# Patient Record
Sex: FEMALE
[De-identification: names, ages, dates, MRNs, and addresses within clinical notes are randomized per-mention and may not be internally consistent; named-entity substitution may affect disease eponyms.]

---

## 2021-06-25 ENCOUNTER — HOSPITAL ENCOUNTER (OUTPATIENT)
Dept: HOSPITAL 5 - TRG | Age: 31
Discharge: HOME | End: 2021-06-25
Attending: OBSTETRICS & GYNECOLOGY
Payer: MEDICAID

## 2021-06-25 VITALS — DIASTOLIC BLOOD PRESSURE: 66 MMHG | SYSTOLIC BLOOD PRESSURE: 110 MMHG

## 2021-06-25 DIAGNOSIS — Z3A.28: ICD-10-CM

## 2021-06-25 LAB
BILIRUB UR QL STRIP: (no result)
BLOOD UR QL VISUAL: (no result)
CAOX CRY #/AREA URNS HPF: (no result) /[HPF]
MUCOUS THREADS #/AREA URNS HPF: (no result) /HPF
PH UR STRIP: 5 [PH] (ref 5–7)
RBC #/AREA URNS HPF: 3 /HPF (ref 0–6)
UROBILINOGEN UR-MCNC: < 2 MG/DL (ref ?–2)
WBC #/AREA URNS HPF: 2 /HPF (ref 0–6)

## 2021-06-25 PROCEDURE — 81001 URINALYSIS AUTO W/SCOPE: CPT

## 2021-06-25 PROCEDURE — 96360 HYDRATION IV INFUSION INIT: CPT

## 2021-06-25 PROCEDURE — 59025 FETAL NON-STRESS TEST: CPT

## 2021-08-20 ENCOUNTER — HOSPITAL ENCOUNTER (OUTPATIENT)
Dept: HOSPITAL 5 - TRG | Age: 31
Discharge: HOME | End: 2021-08-20
Attending: OBSTETRICS & GYNECOLOGY
Payer: MEDICAID

## 2021-08-20 VITALS — SYSTOLIC BLOOD PRESSURE: 97 MMHG | DIASTOLIC BLOOD PRESSURE: 52 MMHG

## 2021-08-20 DIAGNOSIS — Z3A.36: ICD-10-CM

## 2021-08-20 DIAGNOSIS — O26.893: Primary | ICD-10-CM

## 2021-08-20 DIAGNOSIS — R11.0: ICD-10-CM

## 2021-08-20 DIAGNOSIS — Z79.899: ICD-10-CM

## 2021-08-20 DIAGNOSIS — R51.9: ICD-10-CM

## 2021-08-20 LAB
ALT SERPL-CCNC: 10 UNITS/L (ref 7–56)
BACTERIA #/AREA URNS HPF: (no result) /HPF
BILIRUB UR QL STRIP: (no result)
BLOOD UR QL VISUAL: (no result)
HCT VFR BLD CALC: 31.6 % (ref 30.3–42.9)
HGB BLD-MCNC: 11 GM/DL (ref 10.1–14.3)
MCHC RBC AUTO-ENTMCNC: 35 % (ref 30–34)
MCV RBC AUTO: 81 FL (ref 79–97)
MUCOUS THREADS #/AREA URNS HPF: (no result) /HPF
PH UR STRIP: 6 [PH] (ref 5–7)
PLATELET # BLD: 151 K/MM3 (ref 140–440)
PROT UR STRIP-MCNC: (no result) MG/DL
RBC # BLD AUTO: 3.92 M/MM3 (ref 3.65–5.03)
RBC #/AREA URNS HPF: 13 /HPF (ref 0–6)
URATE SERPL-MCNC: 3.7 MG/DL (ref 3.5–7.6)
UROBILINOGEN UR-MCNC: < 2 MG/DL (ref ?–2)
WBC #/AREA URNS HPF: 11 /HPF (ref 0–6)

## 2021-08-20 PROCEDURE — 85027 COMPLETE CBC AUTOMATED: CPT

## 2021-08-20 PROCEDURE — 36415 COLL VENOUS BLD VENIPUNCTURE: CPT

## 2021-08-20 PROCEDURE — 82565 ASSAY OF CREATININE: CPT

## 2021-08-20 PROCEDURE — 87086 URINE CULTURE/COLONY COUNT: CPT

## 2021-08-20 PROCEDURE — 84450 TRANSFERASE (AST) (SGOT): CPT

## 2021-08-20 PROCEDURE — 96365 THER/PROPH/DIAG IV INF INIT: CPT

## 2021-08-20 PROCEDURE — 96366 THER/PROPH/DIAG IV INF ADDON: CPT

## 2021-08-20 PROCEDURE — 84550 ASSAY OF BLOOD/URIC ACID: CPT

## 2021-08-20 PROCEDURE — 84460 ALANINE AMINO (ALT) (SGPT): CPT

## 2021-08-20 PROCEDURE — 81001 URINALYSIS AUTO W/SCOPE: CPT

## 2021-08-20 PROCEDURE — 96361 HYDRATE IV INFUSION ADD-ON: CPT

## 2021-08-20 PROCEDURE — 96367 TX/PROPH/DG ADDL SEQ IV INF: CPT

## 2021-08-20 PROCEDURE — 83615 LACTATE (LD) (LDH) ENZYME: CPT

## 2021-08-20 NOTE — EVENT NOTE
Date: 08/20/21 (Pt is feeling much better)


Pt is 36+ wks, who presented to triage with c/o a HA. She denies vaginal 

bleeding, LOF, ctxs, spots before her eyes, blurred vision, shortness of breath,

chest pain, and upper abdominal pain. There is positive fetal movement. Her 

blood pressures and pre e labs are WNL. She had one blood pressure that was 

143/70. Per RN and pt the cuff was on her elbow. Category 1 fetal monitor strip 

throughout triage stay. Pt desires to go home and states that her HA feels 

better. She was given IV and PO medication for the HA. Pt will have follow up 

visit in the office on Monday @ 10am. Pt aware of appointment date and time.

## 2021-10-24 ENCOUNTER — HOSPITAL ENCOUNTER (EMERGENCY)
Dept: HOSPITAL 5 - ED | Age: 31
Discharge: HOME | End: 2021-10-24
Payer: MEDICAID

## 2021-10-24 VITALS — DIASTOLIC BLOOD PRESSURE: 73 MMHG | SYSTOLIC BLOOD PRESSURE: 117 MMHG

## 2021-10-24 DIAGNOSIS — N39.0: Primary | ICD-10-CM

## 2021-10-24 DIAGNOSIS — N93.9: ICD-10-CM

## 2021-10-24 DIAGNOSIS — N94.6: ICD-10-CM

## 2021-10-24 DIAGNOSIS — N92.1: ICD-10-CM

## 2021-10-24 DIAGNOSIS — Z79.899: ICD-10-CM

## 2021-10-24 DIAGNOSIS — Z98.890: ICD-10-CM

## 2021-10-24 LAB
ALBUMIN SERPL-MCNC: 4.3 G/DL (ref 3.9–5)
ALT SERPL-CCNC: 40 UNITS/L (ref 7–56)
BASOPHILS # (AUTO): 0.1 K/MM3 (ref 0–0.1)
BASOPHILS NFR BLD AUTO: 1 % (ref 0–1.8)
BILIRUB DIRECT SERPL-MCNC: < 0.2 MG/DL (ref 0–0.2)
BLOOD UR QL VISUAL: (no result)
BUN SERPL-MCNC: 15 MG/DL (ref 7–17)
BUN/CREAT SERPL: 21 %
CALCIUM SERPL-MCNC: 8.8 MG/DL (ref 8.4–10.2)
EOSINOPHIL # BLD AUTO: 0.3 K/MM3 (ref 0–0.4)
EOSINOPHIL NFR BLD AUTO: 3.8 % (ref 0–4.3)
HCT VFR BLD CALC: 35.8 % (ref 30.3–42.9)
HEMOLYSIS INDEX: 12
HGB BLD-MCNC: 11.4 GM/DL (ref 10.1–14.3)
LYMPHOCYTES # BLD AUTO: 3.4 K/MM3 (ref 1.2–5.4)
LYMPHOCYTES NFR BLD AUTO: 38.3 % (ref 13.4–35)
MCHC RBC AUTO-ENTMCNC: 32 % (ref 30–34)
MCV RBC AUTO: 81 FL (ref 79–97)
MONOCYTES # (AUTO): 0.5 K/MM3 (ref 0–0.8)
MONOCYTES % (AUTO): 5.8 % (ref 0–7.3)
MUCOUS THREADS #/AREA URNS HPF: (no result) /HPF
PH UR STRIP: (no result) [PH] (ref 5–7)
PLATELET # BLD: 241 K/MM3 (ref 140–440)
PROT UR STRIP-MCNC: (no result) MG/DL
RBC # BLD AUTO: 4.44 M/MM3 (ref 3.65–5.03)
RBC #/AREA URNS HPF: > 182 /HPF (ref 0–6)
UROBILINOGEN UR-MCNC: (no result) MG/DL (ref ?–2)
WBC #/AREA URNS HPF: 145 /HPF (ref 0–6)

## 2021-10-24 PROCEDURE — 85025 COMPLETE CBC W/AUTO DIFF WBC: CPT

## 2021-10-24 PROCEDURE — 81001 URINALYSIS AUTO W/SCOPE: CPT

## 2021-10-24 PROCEDURE — 36415 COLL VENOUS BLD VENIPUNCTURE: CPT

## 2021-10-24 PROCEDURE — 84702 CHORIONIC GONADOTROPIN TEST: CPT

## 2021-10-24 PROCEDURE — 80048 BASIC METABOLIC PNL TOTAL CA: CPT

## 2021-10-24 PROCEDURE — 76856 US EXAM PELVIC COMPLETE: CPT

## 2021-10-24 PROCEDURE — 99284 EMERGENCY DEPT VISIT MOD MDM: CPT

## 2021-10-24 PROCEDURE — 80076 HEPATIC FUNCTION PANEL: CPT

## 2021-10-24 PROCEDURE — 96372 THER/PROPH/DIAG INJ SC/IM: CPT

## 2021-10-24 NOTE — EMERGENCY DEPARTMENT REPORT
ED Female  HPI





- General


Chief complaint: Vaginal Bleeding


Stated complaint: VAGINAL BLEEDING X3 DAYS


Source: patient


Mode of arrival: Ambulatory


Limitations: No Limitations





- History of Present Illness


Initial comments: 





Patient is a A1 30-year-old  female who is approximately 5 weeks 

postpartum through  presents to the ED with acute onset persistent hea

vy vaginal bleeding with large blood clots for the last 12 hours.  Patient 

states that she feels to pads per hour and that she initially was evaluated at 

an urgent care clinic who discharged her home but when she could contacted her 

OB/GYN physician she was advised to come to the ED immediately for further 

evaluation.  Patient denies dysuria, urinary frequency and urgency, nausea, 

vomiting, fever, chills, diarrhea, dizziness, syncope, chest pain or shortness 

of breath, headache, back pain or vaginal discharge.


MD Complaint: vaginal bleeding, pelvic pain (Suprapubic pain)


-: Sudden, hour(s) (12), This morning


Location: suprapubic, other (Vaginal)


Radiation: non-radiating


Severity: severe


Severity scale (0 -10): 8


Quality: cramping, aching


Consistency: constant


Improves with: none


Worsens with: none


Are you Pregnant Now?: No (5 weeks postpartum through )


Associated Symptoms: vaginal bleeding, abdominal pain (Suprapubic pressure and 

pain).  denies: nausea/vomiting, fever/chills, headaches, loss of appetite, 

dysuria, hematuria, rash, shortness of breath, syncope, other





- Related Data


Sexually active: Yes


: 5


Para: 4


A: 1


                                Home Medications











 Medication  Instructions  Recorded  Confirmed  Last Taken


 


Tylenol 1,000 mg PO PRN PRN 21








                                  Previous Rx's











 Medication  Instructions  Recorded  Last Taken  Type


 


Docusate Sodium [Colace] 100 mg PO BID PRN #30 capsule 21 Unknown Rx


 


Ferrous Sulfate [Feosol 325 MG tab] 325 mg PO BID #90 tablet 21 Unknown Rx


 


Ibuprofen [Motrin 800 MG tab] 800 mg PO TID PRN #30 tablet 21 Unknown Rx


 


Lidocain2.5%/Prilocai2.5% [Emla] 5 gm TP ONCE #1 tube 21 Unknown Rx


 


oxyCODONE /ACETAMINOPHEN [Percocet 1 - 2 tab PO Q6HR PRN #14 tablet 21 Unk

nown Rx





5/325 mg]    


 


cephALEXin [Keflex] 500 mg PO Q8HR #30 cap 10/24/21 Unknown Rx


 


medroxyPROGESTERone ACETATE 10 mg PO DAILY #14 tablet 10/24/21 Unknown Rx





[Provera]    











                                    Allergies











Allergy/AdvReac Type Severity Reaction Status Date / Time


 


No Known Allergies Allergy   Verified 21 10:03














ED Review of Systems


ROS: 


Stated complaint: VAGINAL BLEEDING X3 DAYS


Other details as noted in HPI





Constitutional: denies: chills, fever


Eyes: denies: eye pain, eye discharge, vision change


ENT: denies: ear pain, throat pain


Respiratory: denies: cough, shortness of breath, wheezing


Cardiovascular: denies: chest pain, palpitations


Endocrine: no symptoms reported


Gastrointestinal: abdominal pain (Suprapubic pressure and cramps).  denies: 

nausea, vomiting, diarrhea


Genitourinary: abnormal menses (Heavy vaginal bleeding).  denies: urgency, 

dysuria, discharge


Musculoskeletal: denies: back pain, joint swelling, arthralgia


Skin: denies: rash, lesions


Neurological: denies: headache, weakness, paresthesias


Psychiatric: denies: anxiety, depression


Hematological/Lymphatic: denies: easy bleeding, easy bruising





ED Past Medical Hx





- Past Medical History


Previous Medical History?: Yes


Hx Hypertension: No


Hx Heart Attack/AMI: No


Hx Congestive Heart Failure: No


Hx Diabetes: No


Hx Deep Vein Thrombosis: No


Hx Liver Disease: No


Hx Renal Disease: No


Hx Sickle Cell Disease: No


Hx Seizures: No


Hx Asthma: No


Hx COPD: No


Hx HIV: No





- Surgical History


Past Surgical History?: Yes


Additional Surgical History: 





- Social History


Smoking Status: Never Smoker





- Medications


Home Medications: 


                                Home Medications











 Medication  Instructions  Recorded  Confirmed  Last Taken  Type


 


Docusate Sodium [Colace] 100 mg PO BID PRN #30 capsule 21  Unknown Rx


 


Ferrous Sulfate [Feosol 325 MG tab] 325 mg PO BID #90 tablet 21  Unknown 

Rx


 


Ibuprofen [Motrin 800 MG tab] 800 mg PO TID PRN #30 tablet 21  Unknown Rx


 


Lidocain2.5%/Prilocai2.5% [Emla] 5 gm TP ONCE #1 tube 21  Unknown Rx


 


Tylenol 1,000 mg PO PRN PRN 21 History


 


oxyCODONE /ACETAMINOPHEN [Percocet 1 - 2 tab PO Q6HR PRN #14 tablet 21  

Unknown Rx





5/325 mg]     


 


cephALEXin [Keflex] 500 mg PO Q8HR #30 cap 10/24/21  Unknown Rx


 


medroxyPROGESTERone ACETATE 10 mg PO DAILY #14 tablet 10/24/21  Unknown Rx





[Provera]     














ED Physical Exam





- General


Limitations: No Limitations


General appearance: alert, in no apparent distress





- Head


Head exam: Present: atraumatic, normocephalic, normal inspection





- Eye


Eye exam: Present: normal appearance, PERRL, EOMI


Pupils: Present: normal accommodation





- ENT


ENT exam: Present: normal exam, normal orophraynx, mucous membranes moist, TM's 

normal bilaterally, normal external ear exam





- Neck


Neck exam: Present: normal inspection, full ROM





- Respiratory


Respiratory exam: Present: normal lung sounds bilaterally.  Absent: respiratory 

distress, wheezes, rales, rhonchi, chest wall tenderness, accessory muscle use, 

prolonged expiratory





- Cardiovascular


Cardiovascular Exam: Present: regular rate, normal rhythm, normal heart sounds. 

 Absent: systolic murmur, diastolic murmur, rubs, gallop





- GI/Abdominal


GI/Abdominal exam: Present: soft, normal bowel sounds.  Absent: tenderness, 

guarding, rebound, hyperactive bowel sounds, hypoactive bowel sounds, or

ganomegaly





- 


Bi-manual exam: Present: other (Pelvic exam deferred at this time)





- Extremities Exam


Extremities exam: Present: normal inspection, full ROM, normal capillary refill





- Back Exam


Back exam: Present: normal inspection, full ROM.  Absent: tenderness, CVA 

tenderness (R), CVA tenderness (L), muscle spasm, paraspinal tenderness, 

vertebral tenderness





- Neurological Exam


Neurological exam: Present: alert, oriented X3, CN II-XII intact, normal gait, 

reflexes normal





- Psychiatric


Psychiatric exam: Present: normal affect, normal mood





- Skin


Skin exam: Present: warm, dry, intact, normal color.  Absent: rash





ED Course


                                   Vital Signs











  10/24/21





  19:52


 


Temperature 98.1 F


 


Pulse Rate 55 L


 


Respiratory 20





Rate 


 


Blood Pressure 117/73





[Left] 


 


O2 Sat by Pulse 98





Oximetry 














- Reevaluation(s)


Reevaluation #1: 





10/24/21 22:46


I discussed the patient's case with the OB/GYN physician Dr. Suri Alberto, the

 patient's OB/GYN physician who also reviewed all the lab test results, came to 

the ED and evaluated the patient and agreed with plan of care.





ED Medical Decision Making





- Lab Data


Result diagrams: 


                                 10/24/21 19:30





                                 10/24/21 19:30





- Radiology Data


Radiology results: report reviewed, image reviewed





[facility]  


                                     [address] [place] [address]  


 


                                         Ultrasound Report   


                                               Signed  


 


Patient: BISHOP CLARK                                                     

           MR#: [id number]  


[id number]          


: 1990                                                                

Acct:[id number]      


 


Age/Sex: 30 / F                                                                

ADM Date: 10/24/21     


 


Loc: ED       


Attending Dr:   


 


 


Ordering Physician: MATTY BARONE  


Date of Service: 10/24/21  


Procedure(s): US pelvic complete  


Accession Number(s): [id number]  


 


cc: MATTY BARONE   


 


 


PELVIC ULTRASOUND  


 


 INDICATION: Heavy vaginal bleeding, 5 weeks post-partum  


 


 COMPARISON: None pertinent available  


 


 TECHNIQUE: Transabdominal  


 


 FINDINGS:  


 


 Uterus: Measures 10.2 x 5.0 x 6.8 cm. Endometrial stripe measures 4 mm. No 

fluid is seen within the


endometrial canal. No evidence of retained products of conception is seen. No 

focal uterine lesions 


are noted.  


 


 Right ovary: Measures 3 cm in length and shows no abnormalities. Blood flow is 

noted.  


 


 Left ovary: Measures 5 cm in length and shows a 3.6 cm slightly complex cyst 

with a thin septation 


and minimal internal echoes. Ovarian blood flow is noted.  


 


 No free fluid is seen.  


 


 IMPRESSION:  


 1. No uterine abnormalities are seen. No evidence of retained products of 

conception.  


 2. Moderate size probably benign left ovarian cyst. Recommend ultrasound 

follow-up.  


 


 Signer Name: Mehul Fuentes MD   


 Signed: 10/24/2021 9:53 PM  


 Workstation Name: [id number]   


 


 


Transcribed By: CYNDY  


Dictated By: Mehul Fuentes MD  


Electronically Authenticated By: Mehul Fuentes MD    


Signed Date/Time: 10/24/21 2153                                


 


 


 


DD/DT: 10/24/21 2151                                                            

  


TD/TT:








- Medical Decision Making





This is a A1 30-year-old  female who is approximately 5 weeks 

postpartum through  presents to the ED with acute onset persistent 

heavy vaginal bleeding with large blood clots for the last 12 hours.  Patient 

states that she feels to pads per hour and that she initially was evaluated at 

an urgent care clinic who discharged her home but when she could contacted her 

OB/GYN physician she was advised to come to the ED immediately for further 

evaluation.  In the ED, patient is alert and oriented x3 and is not in any distr

ess.  Lab test results were reviewed and are all nonactionable except for 

urinalysis that showed significant UTI and bleeding.  Pelvic ultrasound showed  

no uterine abnormalities are seen. No evidence of retained products of 

conception.  It also showed moderate size probably benign left ovarian cyst. 

Recommend ultrasound follow-up.  Patient was treated in the ED with Rocephin 1 g

 intramuscular injection for acute UTI.  Patient also received 

medroxyprogesterone p.o. x1 and Tylenol 1 g p.o. x1.  I discussed the patient's 

case with the patient's OB/GYN physician Dr. Suri Alberto who came to the ED 

and evaluated the patient and agreed the plan of care.  Otherwise from the 

standpoint of the ED, patient was discharged home on oral antibiotics for acute 

urinary tract infection and Provera, and advised to follow-up with OB/GYN 

physician Dr. Alberto in 3 to 5 days for reevaluation.  Patient was advised 

return to the ED immediately if symptoms get worse.





- Differential Diagnosis


UTI; dysmenorrhea; ovarian cyst; pregnancy; retained products of conception


Critical care attestation.: 


If time is entered above; I have spent that time in minutes in the direct care 

of this critically ill patient, excluding procedure time.








ED Disposition


Clinical Impression: 


 Acute urinary tract infection, Dysfunctional uterine bleeding, Dysmenorrhea, 

Metrorrhagia





Disposition: 01 HOME / SELF CARE / HOMELESS


Is pt being admited?: No


Does the pt Need Aspirin: No


Condition: Stable


Instructions:  Urinary Tract Infection, Adult, Easy-to-Read, Abnormal Uterine 

Bleeding, Easy-to-Read, Dysmenorrhea, Easy-to-Read, Metrorrhagia, Easy-to-Read


Additional Instructions: 


All lab test results were reviewed and are all nonactionable except for urinary 

tract infection in urinalysis.  Pelvic ultrasound showed no uterine 

abnormalities are seen. No evidence of retained products of conception.  It also

 showed moderate size probably benign left ovarian cyst.  Therefore take me

dications with food, drink plenty of fluids and follow-up with your OB/GYN 

physician in 5 to 7 days for reevaluation.  Return to the ED immediately if 

symptoms get worse.


Prescriptions: 


cephALEXin [Keflex] 500 mg PO Q8HR #30 cap


medroxyPROGESTERone ACETATE [Provera] 10 mg PO DAILY #14 tablet


Referrals: 


SURI ALBERTO MD [Staff Physician] - 3-5 Days


Time of Disposition: 22:43


Print Language: ENGLISH

## 2021-10-24 NOTE — CONSULTATION
History of Present Illness


Consult date: 10/24/21


Reason for consult: menorrhagia


History of present illness: 





Patient is a  who is 5 weeks s/p PLTCS and BTL presenting with vaginal 

bleeding. Notes it began on yesterday and is heavy. Notes using at least 1 pad 

per hour. This morning states she soaked through a pad and had bleeding that 

dripped down her leg. During that time she began to have chills. Concerned she 

may be hemorrhaging. Called the answering service and was instructed to present 

to the ED> Went to an urgent care near her home where labs were drawn and she 

was told they were fine and to follow up with OB. Decided to present to [facility] 

after being instructed to present to the ED for evaluation. Denies abdominal 

pain, n/v, lightheadedness and dizziness. 





Past History


Past Medical History: no pertinent history


Past Surgical History: GYN/uterine surgery (PLTCS)


Family/Genetic History: none


Social history: no significant social history





- Obstetrical History


: 5


Para: 5


Hx # Term Pregnancies: 4


Number of  Pregnancies: 0


Spontaneous Abortions: 1


Induced : 0


Number of Living Children: 4





Medications and Allergies


                                    Allergies











Allergy/AdvReac Type Severity Reaction Status Date / Time


 


No Known Allergies Allergy   Verified 21 10:03











                                Home Medications











 Medication  Instructions  Recorded  Confirmed  Last Taken  Type


 


Docusate Sodium [Colace] 100 mg PO BID PRN #30 capsule 21  Unknown Rx


 


Ferrous Sulfate [Feosol 325 MG tab] 325 mg PO BID #90 tablet 21  Unknown 

Rx


 


Ibuprofen [Motrin 800 MG tab] 800 mg PO TID PRN #30 tablet 21  Unknown Rx


 


Lidocain2.5%/Prilocai2.5% [Emla] 5 gm TP ONCE #1 tube 21  Unknown Rx


 


Tylenol 1,000 mg PO PRN PRN 21 History


 


oxyCODONE /ACETAMINOPHEN [Percocet 1 - 2 tab PO Q6HR PRN #14 tablet 21  

Unknown Rx





5/325 mg]     


 


cephALEXin [Keflex] 500 mg PO Q8HR #30 cap 10/24/21  Unknown Rx


 


medroxyPROGESTERone ACETATE 10 mg PO DAILY #14 tablet 10/24/21  Unknown Rx





[Provera]     














Review of Systems


All systems: negative


Genitourinary: vaginal bleeding





- Vital Signs


Vital signs: 


                                   Vital Signs











Temp Pulse Resp BP Pulse Ox


 


 98.1 F   55 L  20   117/73   98 


 


 10/24/21 19:52  10/24/21 19:52  10/24/21 19:52  10/24/21 19:52  10/24/21 19:52








                                        











Temp Pulse Resp BP Pulse Ox


 


 98.1 F   55 L  20   117/73   98 


 


 10/24/21 19:52  10/24/21 19:52  10/24/21 19:52  10/24/21 19:52  10/24/21 19:52














- Physical Exam


Abdomen: Positive: normal appearance, soft, other (incision well-healed)


Genitourinary (Female): Positive: normal external genitalia


Vagina: Positive: other (dark blood in the vagina, cleaned and no lesions noted)


Cervix: Positive: other (No lesions or active bleeding noted )


Uterus: Positive: other (difficult to palpate secondary to hsbitus)


Extremities: Positive: normal





Results


Result Diagrams: 


                                 10/24/21 19:30





                                 10/24/21 19:30


                              Abnormal lab results











  10/24/21 10/24/21 10/24/21 Range/Units





  19:30 19:30 19:54 


 


MCH  26 L    (28-32)  pg


 


RDW  16.4 H    (13.2-15.2)  %


 


Lymph % (Auto)  38.3 H    (13.4-35.0)  %


 


Carbon Dioxide   21 L   (22-30)  mmol/L


 


Urine WBC (Auto)    145.0 H  (0.0-6.0)  /HPF


 


U Epithel Cells (Auto)    29.0 H  (0-13.0)  /HPF








All other labs normal.





Ultrasound: report reviewed





Assessment and Plan





- Patient Problems


(1) Dysfunctional uterine bleeding


Current Visit: Yes   Status: Acute   


Plan to address problem: 


U/S reviewed and no abnormalities of the uterus noted


H/H appropriate at 11.4


Discussed with patient likely experiencing menses


Provera previously given in the ED. May continue with course of Provera, 10 for 

5-10 days. 


Patient encouraged to schedule follow up appointment within the next week.

## 2021-10-24 NOTE — ULTRASOUND REPORT
PELVIC ULTRASOUND



INDICATION: Heavy vaginal bleeding, 5 weeks post-partum



COMPARISON: None pertinent available



TECHNIQUE: Transabdominal



FINDINGS:



Uterus: Measures 10.2 x 5.0 x 6.8 cm. Endometrial stripe measures 4 mm. No fluid is seen within the e
ndometrial canal. No evidence of retained products of conception is seen. No focal uterine lesions ar
e noted.



Right ovary: Measures 3 cm in length and shows no abnormalities. Blood flow is noted.



Left ovary: Measures 5 cm in length and shows a 3.6 cm slightly complex cyst with a thin septation an
d minimal internal echoes. Ovarian blood flow is noted.



No free fluid is seen.



IMPRESSION:

1. No uterine abnormalities are seen. No evidence of retained products of conception.

2. Moderate size probably benign left ovarian cyst. Recommend ultrasound follow-up.



Signer Name: Mehul Fuentes MD 

Signed: 10/24/2021 9:53 PM

Workstation Name: [id number]